# Patient Record
Sex: MALE | Race: WHITE | NOT HISPANIC OR LATINO | Employment: FULL TIME | URBAN - NONMETROPOLITAN AREA
[De-identification: names, ages, dates, MRNs, and addresses within clinical notes are randomized per-mention and may not be internally consistent; named-entity substitution may affect disease eponyms.]

---

## 2018-12-17 ENCOUNTER — HOSPITAL ENCOUNTER (EMERGENCY)
Facility: HOSPITAL | Age: 28
Discharge: HOME | End: 2018-12-17
Attending: EMERGENCY MEDICINE | Admitting: EMERGENCY MEDICINE

## 2018-12-17 VITALS
HEART RATE: 115 BPM | WEIGHT: 155 LBS | DIASTOLIC BLOOD PRESSURE: 87 MMHG | SYSTOLIC BLOOD PRESSURE: 158 MMHG | HEIGHT: 70 IN | BODY MASS INDEX: 22.19 KG/M2 | TEMPERATURE: 98.9 F | OXYGEN SATURATION: 100 % | RESPIRATION RATE: 18 BRPM

## 2018-12-17 DIAGNOSIS — F32.A DEPRESSION, UNSPECIFIED DEPRESSION TYPE: Primary | ICD-10-CM

## 2018-12-17 LAB
6-ACETYL MORPHINE: NEGATIVE
ALBUMIN SERPL-MCNC: 4.5 G/DL (ref 3.5–5)
ALBUMIN/GLOB SERPL: 1.5 G/DL (ref 1.5–2.5)
ALP SERPL-CCNC: 93 U/L (ref 40–129)
ALT SERPL W P-5'-P-CCNC: 21 U/L (ref 10–44)
AMPHET+METHAMPHET UR QL: POSITIVE
ANION GAP SERPL CALCULATED.3IONS-SCNC: 6.3 MMOL/L (ref 3.6–11.2)
AST SERPL-CCNC: 21 U/L (ref 10–34)
BACTERIA UR QL AUTO: ABNORMAL /HPF
BARBITURATES UR QL SCN: NEGATIVE
BASOPHILS # BLD AUTO: 0.02 10*3/MM3 (ref 0–0.3)
BASOPHILS NFR BLD AUTO: 0.2 % (ref 0–2)
BENZODIAZ UR QL SCN: NEGATIVE
BILIRUB SERPL-MCNC: 0.5 MG/DL (ref 0.2–1.8)
BILIRUB UR QL STRIP: NEGATIVE
BUN BLD-MCNC: 8 MG/DL (ref 7–21)
BUN/CREAT SERPL: 10.3 (ref 7–25)
BUPRENORPHINE SERPL-MCNC: POSITIVE NG/ML
CALCIUM SPEC-SCNC: 8.9 MG/DL (ref 7.7–10)
CANNABINOIDS SERPL QL: NEGATIVE
CHLORIDE SERPL-SCNC: 105 MMOL/L (ref 99–112)
CLARITY UR: CLEAR
CO2 SERPL-SCNC: 26.7 MMOL/L (ref 24.3–31.9)
COCAINE UR QL: NEGATIVE
COLOR UR: YELLOW
CREAT BLD-MCNC: 0.78 MG/DL (ref 0.43–1.29)
DEPRECATED RDW RBC AUTO: 36.2 FL (ref 37–54)
EOSINOPHIL # BLD AUTO: 0.35 10*3/MM3 (ref 0–0.7)
EOSINOPHIL NFR BLD AUTO: 4.1 % (ref 0–5)
ERYTHROCYTE [DISTWIDTH] IN BLOOD BY AUTOMATED COUNT: 11.8 % (ref 11.5–14.5)
ETHANOL BLD-MCNC: <10 MG/DL
ETHANOL UR QL: <0.01 %
GFR SERPL CREATININE-BSD FRML MDRD: 119 ML/MIN/1.73
GLOBULIN UR ELPH-MCNC: 3.1 GM/DL
GLUCOSE BLD-MCNC: 77 MG/DL (ref 70–110)
GLUCOSE UR STRIP-MCNC: NEGATIVE MG/DL
HCT VFR BLD AUTO: 40.5 % (ref 42–52)
HGB BLD-MCNC: 14 G/DL (ref 14–18)
HGB UR QL STRIP.AUTO: NEGATIVE
HYALINE CASTS UR QL AUTO: ABNORMAL /LPF
IMM GRANULOCYTES # BLD: 0.01 10*3/MM3 (ref 0–0.03)
IMM GRANULOCYTES NFR BLD: 0.1 % (ref 0–0.5)
KETONES UR QL STRIP: NEGATIVE
LEUKOCYTE ESTERASE UR QL STRIP.AUTO: ABNORMAL
LYMPHOCYTES # BLD AUTO: 2.41 10*3/MM3 (ref 1–3)
LYMPHOCYTES NFR BLD AUTO: 28.4 % (ref 21–51)
MAGNESIUM SERPL-MCNC: 2 MG/DL (ref 1.7–2.6)
MCH RBC QN AUTO: 29.3 PG (ref 27–33)
MCHC RBC AUTO-ENTMCNC: 34.6 G/DL (ref 33–37)
MCV RBC AUTO: 84.7 FL (ref 80–94)
METHADONE UR QL SCN: NEGATIVE
MONOCYTES # BLD AUTO: 0.87 10*3/MM3 (ref 0.1–0.9)
MONOCYTES NFR BLD AUTO: 10.3 % (ref 0–10)
NEUTROPHILS # BLD AUTO: 4.82 10*3/MM3 (ref 1.4–6.5)
NEUTROPHILS NFR BLD AUTO: 56.9 % (ref 30–70)
NITRITE UR QL STRIP: NEGATIVE
OPIATES UR QL: NEGATIVE
OSMOLALITY SERPL CALC.SUM OF ELEC: 272.8 MOSM/KG (ref 273–305)
OXYCODONE UR QL SCN: NEGATIVE
PCP UR QL SCN: NEGATIVE
PH UR STRIP.AUTO: 6.5 [PH] (ref 5–8)
PLATELET # BLD AUTO: 241 10*3/MM3 (ref 130–400)
PMV BLD AUTO: 10.2 FL (ref 6–10)
POTASSIUM BLD-SCNC: 3.6 MMOL/L (ref 3.5–5.3)
PROT SERPL-MCNC: 7.6 G/DL (ref 6–8)
PROT UR QL STRIP: NEGATIVE
RBC # BLD AUTO: 4.78 10*6/MM3 (ref 4.7–6.1)
RBC # UR: ABNORMAL /HPF
REF LAB TEST METHOD: ABNORMAL
SODIUM BLD-SCNC: 138 MMOL/L (ref 135–153)
SP GR UR STRIP: 1.01 (ref 1–1.03)
SQUAMOUS #/AREA URNS HPF: ABNORMAL /HPF
UROBILINOGEN UR QL STRIP: ABNORMAL
WBC NRBC COR # BLD: 8.48 10*3/MM3 (ref 4.5–12.5)
WBC UR QL AUTO: ABNORMAL /HPF

## 2018-12-17 PROCEDURE — 99284 EMERGENCY DEPT VISIT MOD MDM: CPT

## 2018-12-17 PROCEDURE — 80307 DRUG TEST PRSMV CHEM ANLYZR: CPT | Performed by: PHYSICIAN ASSISTANT

## 2018-12-17 PROCEDURE — 83735 ASSAY OF MAGNESIUM: CPT | Performed by: PHYSICIAN ASSISTANT

## 2018-12-17 PROCEDURE — 80053 COMPREHEN METABOLIC PANEL: CPT | Performed by: PHYSICIAN ASSISTANT

## 2018-12-17 PROCEDURE — 85025 COMPLETE CBC W/AUTO DIFF WBC: CPT | Performed by: PHYSICIAN ASSISTANT

## 2018-12-17 PROCEDURE — 81001 URINALYSIS AUTO W/SCOPE: CPT | Performed by: PHYSICIAN ASSISTANT

## 2018-12-17 RX ORDER — HYDROXYZINE HYDROCHLORIDE 25 MG/1
25 TABLET, FILM COATED ORAL EVERY 8 HOURS PRN
Qty: 15 TABLET | Refills: 0 | Status: SHIPPED | OUTPATIENT
Start: 2018-12-17 | End: 2019-03-07

## 2018-12-17 NOTE — NURSING NOTE
Patient reported to ED with generalized anxiety and depression. Patient denies SI and HI. Patient rated anxiety 3/10 and depression 7/10.Patient stated this last year has been a nightmare with a serious job loss and a serious break-up involving children.Patient denies all drug and ETOH abuse .

## 2018-12-18 NOTE — NURSING NOTE
Clinicals presented to Dr. Rhodes. Orders received to d/c patient home with information for outpatient follow up. Dr. Rhodes recommends patient also be given prescription for Vistaril. ED provider made aware.

## 2018-12-18 NOTE — ED PROVIDER NOTES
Subjective   28-year-old male who presents to the ED today for a mental health evaluation.  He states he has had a rough year and a half.  He states his depression has gotten worse over the last 2 months.  He states his sister recommended that he come here for an evaluation.  He states he has recently gone through a divorce.  He has lost 2 different jobs.  He recently broke up with his new girlfriend.  He denies any suicidal or homicidal ideations.  He denies any drug use.  He states he does drink one or 2 beers per week with dinner.  He states his appetite has been poor.  He states he sleeps all the time.  He denies any hallucinations.  He states he is currently on Zoloft and BuSpar and has been taking them as prescribed.        History provided by:  Patient  Mental Health Problem   Presenting symptoms: depression    Presenting symptoms: no suicidal thoughts    Degree of incapacity (severity):  Moderate  Onset quality:  Gradual  Duration:  2 months  Timing:  Constant  Progression:  Worsening  Chronicity:  New  Context: stressful life event    Context: not alcohol use and not drug abuse    Treatment compliance:  All of the time  Relieved by:  Nothing  Worsened by:  Nothing  Associated symptoms: anhedonia, appetite change and hypersomnia    Risk factors: family hx of mental illness and hx of mental illness        Review of Systems   Constitutional: Positive for appetite change.   HENT: Negative.    Eyes: Negative.    Respiratory: Negative.    Cardiovascular: Negative.    Gastrointestinal: Negative.    Genitourinary: Negative.    Musculoskeletal: Negative.    Skin: Negative.    Neurological: Negative.    Psychiatric/Behavioral: Positive for dysphoric mood. Negative for suicidal ideas.   All other systems reviewed and are negative.      Past Medical History:   Diagnosis Date   • Anxiety    • Depression        No Known Allergies    History reviewed. No pertinent surgical history.    Family History   Problem Relation Age  of Onset   • Anxiety disorder Sister    • Depression Sister        Social History     Socioeconomic History   • Marital status: Single     Spouse name: Not on file   • Number of children: Not on file   • Years of education: Not on file   • Highest education level: Not on file   Tobacco Use   • Smoking status: Current Every Day Smoker     Years: 6.00     Types: Electronic Cigarette   Substance and Sexual Activity   • Alcohol use: Yes     Frequency: Never     Comment: 1-2 weekly   • Sexual activity: Not Currently     Partners: Female           Objective   Physical Exam   Constitutional: He is oriented to person, place, and time. He appears well-developed and well-nourished. No distress.   HENT:   Head: Normocephalic and atraumatic.   Right Ear: External ear normal.   Left Ear: External ear normal.   Nose: Nose normal.   Mouth/Throat: Oropharynx is clear and moist.   Eyes: Conjunctivae and EOM are normal. Pupils are equal, round, and reactive to light.   Neck: Normal range of motion. Neck supple.   Cardiovascular: Regular rhythm, normal heart sounds and intact distal pulses. Tachycardia present.   Pulmonary/Chest: Effort normal and breath sounds normal.   Abdominal: Soft. Bowel sounds are normal.   Musculoskeletal: Normal range of motion.   Neurological: He is alert and oriented to person, place, and time.   Skin: Skin is warm and dry. Capillary refill takes less than 2 seconds.   Psychiatric: His speech is normal and behavior is normal. Judgment normal. His mood appears anxious. Cognition and memory are normal. He exhibits a depressed mood. He expresses no homicidal and no suicidal ideation.   Nursing note and vitals reviewed.      Procedures           ED Course  ED Course as of Dec 18 0935   Mon Dec 17, 2018   1908 Medically clear for psych  []   1922 Psychiatry advised patient can be discharged at this time.  []      ED Course User Index  [] Virginia Orourke, PA                  [facility]  Number of Diagnoses or  Management Options  Depression, unspecified depression type:      Amount and/or Complexity of Data Reviewed  Clinical lab tests: reviewed    Patient Progress  Patient progress: stable        Final diagnoses:   Depression, unspecified depression type            Virginia Orourke, PA  12/18/18 6534

## 2019-03-07 ENCOUNTER — OFFICE VISIT (OUTPATIENT)
Dept: PSYCHIATRY | Facility: CLINIC | Age: 29
End: 2019-03-07

## 2019-03-07 VITALS
HEIGHT: 70 IN | DIASTOLIC BLOOD PRESSURE: 82 MMHG | WEIGHT: 169.6 LBS | SYSTOLIC BLOOD PRESSURE: 137 MMHG | BODY MASS INDEX: 24.28 KG/M2 | HEART RATE: 74 BPM

## 2019-03-07 DIAGNOSIS — F33.1 MAJOR DEPRESSIVE DISORDER, RECURRENT EPISODE, MODERATE (HCC): ICD-10-CM

## 2019-03-07 DIAGNOSIS — F17.200 NICOTINE DEPENDENCE, UNCOMPLICATED, UNSPECIFIED NICOTINE PRODUCT TYPE: ICD-10-CM

## 2019-03-07 DIAGNOSIS — F11.20 UNCOMPLICATED OPIOID DEPENDENCE (HCC): ICD-10-CM

## 2019-03-07 DIAGNOSIS — Z79.899 MEDICATION MANAGEMENT: Primary | ICD-10-CM

## 2019-03-07 DIAGNOSIS — F15.20 METHAMPHETAMINE ADDICTION (HCC): ICD-10-CM

## 2019-03-07 LAB
AMPHETAMINE CUT-OFF: NORMAL
BENZODIAZIPINE CUT-OFF: NORMAL
BUPRENORPHINE CUT-OFF: NORMAL
COCAINE CUT-OFF: NORMAL
EXTERNAL AMPHETAMINE SCREEN URINE: NEGATIVE
EXTERNAL BENZODIAZEPINE SCREEN URINE: NEGATIVE
EXTERNAL BUPRENORPHINE SCREEN URINE: NEGATIVE
EXTERNAL COCAINE SCREEN URINE: NEGATIVE
EXTERNAL MDMA: NEGATIVE
EXTERNAL METHADONE SCREEN URINE: NEGATIVE
EXTERNAL METHAMPHETAMINE SCREEN URINE: NEGATIVE
EXTERNAL OPIATES SCREEN URINE: NEGATIVE
EXTERNAL OXYCODONE SCREEN URINE: NEGATIVE
EXTERNAL THC SCREEN URINE: NEGATIVE
MDMA CUT-OFF: NORMAL
METHADONE CUT-OFF: NORMAL
METHAMPHETAMINE CUT-OFF: NORMAL
OPIATES CUT-OFF: NORMAL
OXYCODONE CUT-OFF: NORMAL
THC CUT-OFF: NORMAL

## 2019-03-07 PROCEDURE — 90791 PSYCH DIAGNOSTIC EVALUATION: CPT | Performed by: SOCIAL WORKER

## 2019-03-07 RX ORDER — LORATADINE 10 MG/1
TABLET ORAL
Refills: 0 | COMMUNITY
Start: 2019-01-30 | End: 2019-03-22
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
## 2019-03-11 DIAGNOSIS — F17.200 TOBACCO USE DISORDER: ICD-10-CM

## 2019-03-11 DIAGNOSIS — F33.1 MAJOR DEPRESSIVE DISORDER, RECURRENT EPISODE, MODERATE (HCC): ICD-10-CM

## 2019-03-11 DIAGNOSIS — F11.20 OPIOID TYPE DEPENDENCE, CONTINUOUS (HCC): ICD-10-CM

## 2019-03-11 DIAGNOSIS — F15.20 AMPHETAMINE DEPENDENCE (HCC): Primary | ICD-10-CM

## 2019-03-12 ENCOUNTER — TELEPHONE (OUTPATIENT)
Dept: PSYCHIATRY | Facility: CLINIC | Age: 29
End: 2019-03-12

## 2019-03-14 ENCOUNTER — OFFICE VISIT (OUTPATIENT)
Dept: PSYCHIATRY | Facility: CLINIC | Age: 29
End: 2019-03-14

## 2019-03-14 ENCOUNTER — LAB (OUTPATIENT)
Dept: LAB | Facility: HOSPITAL | Age: 29
End: 2019-03-14

## 2019-03-14 VITALS
WEIGHT: 174.4 LBS | SYSTOLIC BLOOD PRESSURE: 136 MMHG | HEART RATE: 80 BPM | DIASTOLIC BLOOD PRESSURE: 80 MMHG | HEIGHT: 70 IN | BODY MASS INDEX: 24.97 KG/M2

## 2019-03-14 DIAGNOSIS — F15.20 METHAMPHETAMINE ADDICTION (HCC): Primary | ICD-10-CM

## 2019-03-14 DIAGNOSIS — F33.1 MODERATE EPISODE OF RECURRENT MAJOR DEPRESSIVE DISORDER (HCC): ICD-10-CM

## 2019-03-14 DIAGNOSIS — F17.290 OTHER TOBACCO PRODUCT NICOTINE DEPENDENCE, UNCOMPLICATED: ICD-10-CM

## 2019-03-14 DIAGNOSIS — F11.20 UNCOMPLICATED OPIOID DEPENDENCE (HCC): ICD-10-CM

## 2019-03-14 DIAGNOSIS — F43.22 ADJUSTMENT DISORDER WITH ANXIOUS MOOD: ICD-10-CM

## 2019-03-14 DIAGNOSIS — F15.20 METHAMPHETAMINE ADDICTION (HCC): ICD-10-CM

## 2019-03-14 LAB
HAV IGM SERPL QL IA: NORMAL
HBV CORE IGM SERPL QL IA: NORMAL
HBV SURFACE AG SERPL QL IA: NORMAL
HCV AB SER DONR QL: NORMAL
HIV1+2 AB SER QL: NORMAL

## 2019-03-14 PROCEDURE — G0432 EIA HIV-1/HIV-2 SCREEN: HCPCS

## 2019-03-14 PROCEDURE — 80074 ACUTE HEPATITIS PANEL: CPT

## 2019-03-14 PROCEDURE — 90792 PSYCH DIAG EVAL W/MED SRVCS: CPT | Performed by: NURSE PRACTITIONER

## 2019-03-14 PROCEDURE — 36415 COLL VENOUS BLD VENIPUNCTURE: CPT

## 2019-03-14 RX ORDER — NALTREXONE HYDROCHLORIDE 50 MG/1
50 TABLET, FILM COATED ORAL DAILY
Qty: 30 TABLET | Refills: 0 | Status: SHIPPED | OUTPATIENT
Start: 2019-03-14 | End: 2019-04-10 | Stop reason: SDUPTHER

## 2019-03-14 RX ORDER — SERTRALINE HYDROCHLORIDE 100 MG/1
100 TABLET, FILM COATED ORAL DAILY
Qty: 30 TABLET | Refills: 0 | Status: SHIPPED | OUTPATIENT
Start: 2019-03-14 | End: 2019-04-10 | Stop reason: SDUPTHER

## 2019-03-14 NOTE — PROGRESS NOTES
Subjective   Valentín Mcghee is a 28 y.o. male who is here today for the first time for medication management     Chief Complaint:  Anxiety related to substance abuse     History of Present Illness  Patient presents today after not being able to go into the IOP program because his work refuses to let him off. Patient reports he has contacted his  but they still will not let him off work.  Patient reports that he wanted the extra support and program to help him stay clean as currently he has home life is more stable but he is worried if it becomes unstable how he will cope.  Patient reports that he does enjoy doing his job and it has been helpful for his anxiety and depression as well as staying clean.  Patient reports that when he was here in the hospital he was placed on Zoloft 100 mg and that had been very helpful for him which had decreased his anxiety and depression significantly.  Patient currently rates his anxiety a 2-3 along with his depression on a scale of 0-10 with 10 being the worst.  Patient reports that he is sleeping good getting roughly 6-8 hours of sleep at night.  Patient reports that his appetite has been good as he has gained 2 pounds since he was last weighed.  Patient reports that he did the [facility] which was good for him and he has been going to 12-step meetings at least twice a week.  Patient reports that he is still having some cravings but they are manageable for him but he does not want them to get out of control if something were to change at home.  Patient denies any substance abuse since January 2019 before he was admitted.  Patient adamantly denies any SI or HI.  Patient denies any side effects to the current medicine.  Patient denies any auditory or visual hallucinations.        The following portions of the patient's history were reviewed and updated as appropriate: allergies, current medications, past family history, past medical history, past social  "history, past surgical history and problem list.    Review of Systems   Psychiatric/Behavioral: Positive for dysphoric mood. The patient is nervous/anxious.    All other systems reviewed and are negative.      Objective   Physical Exam   Constitutional: He appears well-developed and well-nourished.   Psychiatric: His speech is normal and behavior is normal. Thought content normal. His mood appears anxious. Cognition and memory are normal. He exhibits a depressed mood.   Nursing note and vitals reviewed.    Blood pressure 136/80, pulse 80, height 177.8 cm (70\"), weight 79.1 kg (174 lb 6.4 oz). Body mass index is 25.02 kg/m².      No Known Allergies    Recent Results (from the past 168 hour(s))   [facility] Drug Screen    Collection Time: 03/07/19  2:08 PM   Result Value Ref Range    External Amphetamine Screen Urine Negative     Amphetamine Cut-Off 1000mg/ml     External Benzodiazepine Screen Urine Negative     Benzodiazipine Cut-Off 300mg/ml     External Cocaine Screen Urine Negative     Cocaine Cut-Off 300mg/ml     External THC Screen Urine Negative     THC Cut-Off 50mg/ml     External Methadone Screen Urine Negative     Methadone Cut-Off 300mg/ml     External Methamphetamine Screen Urine Negative     Methamphetamine Cut-Off 1000mg/ml     External Oxycodone Screen Urine Negative     Oxycodone Cut-Off 100mg/ml     External Buprenorphine Screen Urine Negative     Buprenorphine Cut-Off 10mg/ml     External MDMA Negative     MDMA Cut-Off 500mg/ml     External Opiates Screen Urine Negative     Opiates Cut-Off 300mg/ml        Current Medications:   Current Outpatient Medications   Medication Sig Dispense Refill   • loratadine (CLARITIN) 10 MG tablet GIVE ONE TABLET EACH MORNING AS NEEDED  0   • sertraline (ZOLOFT) 100 MG tablet Take 1 tablet by mouth Daily. 30 tablet 0   • naltrexone (DEPADE) 50 MG tablet Take 1 tablet by mouth Daily. 30 tablet 0     No current facility-administered medications for this visit.        Mental " Status Exam:   Hygiene:   good  Cooperation:  Cooperative  Eye Contact:  Good  Psychomotor Behavior:  Restless  Affect:  Appropriate  Hopelessness: Denies  Speech:  Normal  Thought Process:  Goal directed and Linear  Thought Content:  Normal  Suicidal:  None  Homicidal:  None  Hallucinations:  None  Delusion:  None  Memory:  Intact  Orientation:  Person, Place, Time and Situation  Reliability:  fair  Insight:  Fair  Judgement:  Fair  Impulse Control:  Fair  Physical/Medical Issues:  No     Assessment/Plan   Diagnoses and all orders for this visit:    Methamphetamine addiction (CMS/HCC)  -     sertraline (ZOLOFT) 100 MG tablet; Take 1 tablet by mouth Daily.  -     naltrexone (DEPADE) 50 MG tablet; Take 1 tablet by mouth Daily.  -     Hepatitis Panel, Acute; Future  -     HIV-1 & HIV-2 Antibodies; Future    Uncomplicated opioid dependence (CMS/HCC)  -     sertraline (ZOLOFT) 100 MG tablet; Take 1 tablet by mouth Daily.  -     naltrexone (DEPADE) 50 MG tablet; Take 1 tablet by mouth Daily.    Other tobacco product nicotine dependence, uncomplicated    Moderate episode of recurrent major depressive disorder (CMS/HCC)  -     sertraline (ZOLOFT) 100 MG tablet; Take 1 tablet by mouth Daily.    Adjustment disorder with anxious mood  -     sertraline (ZOLOFT) 100 MG tablet; Take 1 tablet by mouth Daily.        Discused medications options.  Continue Zoloft 100 mg for depression and anxiety.  Begin naltrexone 50 mg daily for opioid and Suboxone cravings.  Discussed the risks, beneefits, and side effects of the medications; patient ackowledged and verbally consented.  Patient is aware to call the [facility] with any worsening of symptoms.  Patient is agreeable to call 911 or go to the nearest ER should he/she begin having SI/HI.  Lab work was obtained for the patient as he had had a CMP in December 2018 BUN 8 creatinine 0.78 his ALT was 21 AST was 21 as well his current CMP was all within normal limits at this time  patient's last UDS on March 7 was within normal limits as well so we will begin the naltrexone oral tablet to see if he can tolerate before beginning the injections.  Discussed the side effects in detail regarding the naltrexone patient verbally agreed and consented.  Informed the patient that according to mental health rights as well as substance abuse rights he needs to contact his HR in regards to getting off work to attend her IEP program as the patient wanted to voluntarily do this for himself.  Instructed patient to not speak with managers but to HR.  Patient stated that he would call them regarding his time off needed for his treatment.  Patient states that they gave him a difficult time even taking off for his medication refills, informed patient that that is a requirement and if any notes are needed for excuses for work to inform me.  Patient was concerned regarding [facility] and his possible exposure to hepatitis we will order a hepatitis panel along with HIV testing.  Patient will follow-up in 4 weeks to evaluate his symptoms.  Informed patient that if he needs to have a sooner appointment to notify the [facility] or if any side effects were to occur, patient verbally agreed and consented.      Errors in dictation may reflect use of voice recognition software and not all errors in transcription may have been detected prior to signing.

## 2019-03-22 ENCOUNTER — OFFICE VISIT (OUTPATIENT)
Dept: RETAIL CLINIC | Facility: CLINIC | Age: 29
End: 2019-03-22

## 2019-03-22 VITALS
DIASTOLIC BLOOD PRESSURE: 64 MMHG | OXYGEN SATURATION: 98 % | TEMPERATURE: 100.7 F | WEIGHT: 180.8 LBS | HEIGHT: 69 IN | RESPIRATION RATE: 18 BRPM | SYSTOLIC BLOOD PRESSURE: 122 MMHG | BODY MASS INDEX: 26.78 KG/M2 | HEART RATE: 89 BPM

## 2019-03-22 DIAGNOSIS — J02.9 SORE THROAT: Primary | ICD-10-CM

## 2019-03-22 LAB
EXPIRATION DATE: NORMAL
EXPIRATION DATE: NORMAL
FLUAV AG NPH QL: NEGATIVE
FLUBV AG NPH QL: NEGATIVE
INTERNAL CONTROL: NORMAL
INTERNAL CONTROL: NORMAL
Lab: NORMAL
Lab: NORMAL
S PYO AG THROAT QL: NEGATIVE

## 2019-03-22 PROCEDURE — 99213 OFFICE O/P EST LOW 20 MIN: CPT | Performed by: NURSE PRACTITIONER

## 2019-03-22 PROCEDURE — 87880 STREP A ASSAY W/OPTIC: CPT | Performed by: NURSE PRACTITIONER

## 2019-03-22 PROCEDURE — 87804 INFLUENZA ASSAY W/OPTIC: CPT | Performed by: NURSE PRACTITIONER

## 2019-03-22 NOTE — PROGRESS NOTES
"RENEERAFAELAGUILA@  Valentín Mcghee is a 28 y.o. male.   Chief Complaint   Patient presents with   • Sore Throat      Sore Throat    This is a new problem. The current episode started yesterday. The problem has been unchanged. Maximum temperature: has not checked temperature, but has felt warm. The fever has been present for less than 1 day. Associated symptoms include congestion (nasal), coughing (non-productive) and headaches. Pertinent negatives include no diarrhea, shortness of breath or vomiting. He has tried nothing for the symptoms.        Valentín Mcghee  presents to [facility] with cc of sore throat. Reviewed the PMFSH. See ROS.  The following portions of the patient's history were reviewed and updated as appropriate: allergies, current medications, past family history, past medical history, past social history, past surgical history and problem list.    Current Outpatient Medications:   •  naltrexone (DEPADE) 50 MG tablet, Take 1 tablet by mouth Daily., Disp: 30 tablet, Rfl: 0  •  sertraline (ZOLOFT) 100 MG tablet, Take 1 tablet by mouth Daily., Disp: 30 tablet, Rfl: 0    No Known Allergies    Review of Systems   Constitutional: Positive for fever. Negative for fatigue.   HENT: Positive for congestion (nasal) and sore throat.    Respiratory: Positive for cough (non-productive). Negative for shortness of breath.    Cardiovascular: Negative for chest pain.   Gastrointestinal: Negative for diarrhea and vomiting.   Endocrine: Negative for cold intolerance.   Skin: Negative for rash.   Neurological: Positive for headaches.   Hematological: Negative for adenopathy.       Objective     Visit Vitals  /64   Pulse 89   Temp (!) 100.7 °F (38.2 °C) (Temporal)   Resp 18   Ht 175.3 cm (69\")   Wt 82 kg (180 lb 12.8 oz)   SpO2 98%   BMI 26.70 kg/m²         Physical Exam   Constitutional: He is oriented to person, place, and time. He appears well-developed and well-nourished. No distress.   HENT:   Head: Normocephalic and " atraumatic.   Right Ear: Tympanic membrane, external ear and ear canal normal.   Left Ear: Tympanic membrane, external ear and ear canal normal.   Nose: Mucosal edema present. Right sinus exhibits no maxillary sinus tenderness and no frontal sinus tenderness. Left sinus exhibits no maxillary sinus tenderness and no frontal sinus tenderness.   Mouth/Throat: Uvula is midline and mucous membranes are normal. Posterior oropharyngeal erythema present. No oropharyngeal exudate. Tonsils are 2+ on the right. Tonsils are 2+ on the left. No tonsillar exudate.   Eyes: Conjunctivae and EOM are normal. Pupils are equal, round, and reactive to light.   Neck: Normal range of motion. Neck supple.   Cardiovascular: Normal rate, regular rhythm and normal heart sounds.   Pulmonary/Chest: Effort normal and breath sounds normal. No respiratory distress.   Abdominal: Soft. Bowel sounds are normal.   Musculoskeletal: Normal range of motion.   Lymphadenopathy:     He has cervical adenopathy.   Neurological: He is alert and oriented to person, place, and time.   Skin: Skin is warm and dry. No rash noted.   Psychiatric: He has a normal mood and affect. His behavior is normal. Judgment and thought content normal.   Nursing note and vitals reviewed.      Lab Results (last 24 hours)     Procedure Component Value Units Date/Time    POCT rapid strep A [[id number]]  (Normal) Collected:  03/22/19 1500    Specimen:  Swab Updated:  03/22/19 1501     Rapid Strep A Screen Negative     Internal Control Passed     Lot Number [id number]     Expiration Date 8/30    POC Influenza A / B [[id number]]  (Normal) Collected:  03/22/19 1500    Specimen:  Swab Updated:  03/22/19 1500     Rapid Influenza A Ag Negative     Rapid Influenza B Ag Negative     Internal Control Passed     Lot Number [id number]     Expiration Date 20/33/21          Assessment/Plan   Valentín was seen today for sore throat.    Diagnoses and all orders for this visit:    Sore throat  -     POCT  rapid strep A  -     POC Influenza A / B

## 2019-03-22 NOTE — PATIENT INSTRUCTIONS
Sore Throat  A sore throat is pain, burning, irritation, or scratchiness in the throat. When you have a sore throat, you may feel pain or tenderness in your throat when you swallow or talk.  Many things can cause a sore throat, including:  · An infection.  · Seasonal allergies.  · Dryness in the air.  · Irritants, such as smoke or pollution.  · Gastroesophageal reflux disease (GERD).  · A tumor.    A sore throat is often the first sign of another sickness. It may happen with other symptoms, such as coughing, sneezing, fever, and swollen neck glands. Most sore throats go away without medical treatment.  Follow these instructions at home:  · Take over-the-counter medicines only as told by your health care provider.  · Drink enough fluids to keep your urine clear or pale yellow.  · Rest as needed.  · To help with pain, try:  ? Sipping warm liquids, such as broth, herbal tea, or warm water.  ? Eating or drinking cold or frozen liquids, such as frozen ice pops.  ? Gargling with a salt-water mixture 3-4 times a day or as needed. To make a salt-water mixture, completely dissolve ½-1 tsp of salt in 1 cup of warm water.  ? Sucking on hard candy or throat lozenges.  ? Putting a cool-mist humidifier in your bedroom at night to moisten the air.  ? Sitting in the bathroom with the door closed for 5-10 minutes while you run hot water in the shower.  · Do not use any tobacco products, such as cigarettes, chewing tobacco, and e-cigarettes. If you need help quitting, ask your health care provider.  Contact a health care provider if:  · You have a fever for more than 2-3 days.  · You have symptoms that last (are persistent) for more than 2-3 days.  · Your throat does not get better within 7 days.  · You have a fever and your symptoms suddenly get worse.  Get help right away if:  · You have difficulty breathing.  · You cannot swallow fluids, soft foods, or your saliva.  · You have increased swelling in your throat or neck.  · You have  persistent nausea and vomiting.  This information is not intended to replace advice given to you by your health care provider. Make sure you discuss any questions you have with your health care provider.  Document Released: 01/25/2006 Document Revised: 08/13/2017 Document Reviewed: 10/07/2016  [employer] Interactive Patient Education © 2019 [employer]

## 2019-04-10 ENCOUNTER — OFFICE VISIT (OUTPATIENT)
Dept: PSYCHIATRY | Facility: CLINIC | Age: 29
End: 2019-04-10

## 2019-04-10 VITALS
HEART RATE: 111 BPM | DIASTOLIC BLOOD PRESSURE: 91 MMHG | SYSTOLIC BLOOD PRESSURE: 140 MMHG | HEIGHT: 69 IN | BODY MASS INDEX: 26.31 KG/M2 | WEIGHT: 177.6 LBS

## 2019-04-10 DIAGNOSIS — F43.22 ADJUSTMENT DISORDER WITH ANXIOUS MOOD: ICD-10-CM

## 2019-04-10 DIAGNOSIS — F33.1 MODERATE EPISODE OF RECURRENT MAJOR DEPRESSIVE DISORDER (HCC): ICD-10-CM

## 2019-04-10 DIAGNOSIS — F11.20 UNCOMPLICATED OPIOID DEPENDENCE (HCC): ICD-10-CM

## 2019-04-10 DIAGNOSIS — F15.20 METHAMPHETAMINE ADDICTION (HCC): ICD-10-CM

## 2019-04-10 PROCEDURE — 99214 OFFICE O/P EST MOD 30 MIN: CPT | Performed by: NURSE PRACTITIONER

## 2019-04-10 RX ORDER — BUSPIRONE HYDROCHLORIDE 5 MG/1
5 TABLET ORAL 3 TIMES DAILY
Qty: 90 TABLET | Refills: 0 | Status: SHIPPED | OUTPATIENT
Start: 2019-04-10 | End: 2019-04-24 | Stop reason: SDUPTHER

## 2019-04-10 RX ORDER — SERTRALINE HYDROCHLORIDE 100 MG/1
100 TABLET, FILM COATED ORAL DAILY
Qty: 30 TABLET | Refills: 0 | Status: SHIPPED | OUTPATIENT
Start: 2019-04-10 | End: 2019-04-24 | Stop reason: SDUPTHER

## 2019-04-10 RX ORDER — NALTREXONE HYDROCHLORIDE 50 MG/1
50 TABLET, FILM COATED ORAL DAILY
Qty: 30 TABLET | Refills: 0 | Status: SHIPPED | OUTPATIENT
Start: 2019-04-10

## 2019-04-10 NOTE — PROGRESS NOTES
Subjective   Valentín Mcghee is a 28 y.o. male who is here today for the first time for medication management     Chief Complaint:  Anxiety related to substance abuse     History of Present Illness   Patient comes in today almost 15 minutes late for his appointment.  Patient states that he has not pursued getting into the IOP as he does not want to lose his job currently as it is a good income for him and he does enjoy working in the people he is around.  Patient states that he has been taking his medication as prescribed and has been doing well.  Patient denies any depressive symptoms.  Patient states that his anxiety is still roughly a 3-4 on a scale of 0-10 with 10 being the worst.  Patient states that he was at the movies one evening and there were some people smoking pot outside and he states it was a slight trigger for him and did increase in anxiety but he was able to overcome it.  Patient states that his brother in law did have a beer and stated that he was going to have to drink that somewhere else as he still feels as if triggers are present for him and when they are visualized he reports some cravings but states he is able to overcome them.  Patient's was proud and showed his 60-day clean of sobriety and states he has been going to his AA meetings.  Patient reports that his appetite has been good and he is sleeping well getting roughly 6-8 hours of sleep at night.  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  Patient reports that he would like to consider the Vivitrol injection as his concern was the long-standing use of being in his body if he got into a car wreck and if it would affect any anesthesia if he had to have surgery.  Patient  "stated that he would think about it but he was currently taking his medication and pursuits prescribed and denied any side effects.  Patient adamantly denies any SI or HI.  Patient denies any auditory or visual hallucinations.      The following portions of the patient's history were reviewed and updated as appropriate: allergies, current medications, past family history, past medical history, past social history, past surgical history and problem list.    Review of Systems   Psychiatric/Behavioral: Negative for dysphoric mood. The patient is nervous/anxious.    All other systems reviewed and are negative.      Objective   Physical Exam   Constitutional: He appears well-developed and well-nourished.   Psychiatric: His speech is normal and behavior is normal. Thought content normal. His mood appears anxious. Cognition and memory are normal. He does not exhibit a depressed mood.   Nursing note and vitals reviewed.    Blood pressure 140/91, pulse 111, height 175.3 cm (69\"), weight 80.6 kg (177 lb 9.6 oz). Body mass index is 26.23 kg/m².      No Known Allergies    No results found for this or any previous visit (from the past 168 hour(s)).    Current Medications:   Current Outpatient Medications   Medication Sig Dispense Refill   • naltrexone (DEPADE) 50 MG tablet Take 1 tablet by mouth Daily. 30 tablet 0   • sertraline (ZOLOFT) 100 MG tablet Take 1 tablet by mouth Daily. 30 tablet 0   • busPIRone (BUSPAR) 5 MG tablet Take 1 tablet by mouth 3 (Three) Times a Day. 90 tablet 0     No current facility-administered medications for this visit.        Mental Status Exam:   Hygiene:   good  Cooperation:  Cooperative  Eye Contact:  Good  Psychomotor Behavior:  Restless  Affect:  Appropriate  Hopelessness: Denies  Speech:  Normal  Thought Process:  Goal directed and Linear  Thought Content:  Normal  Suicidal:  None  Homicidal:  None  Hallucinations:  None  Delusion:  None  Memory:  Intact  Orientation:  Person, Place, Time and " Situation  Reliability:  fair  Insight:  Fair  Judgement:  Fair  Impulse Control:  Fair  Physical/Medical Issues:  No     Assessment/Plan   Diagnoses and all orders for this visit:    Methamphetamine addiction (CMS/HCC)  -     naltrexone (DEPADE) 50 MG tablet; Take 1 tablet by mouth Daily.  -     sertraline (ZOLOFT) 100 MG tablet; Take 1 tablet by mouth Daily.  -     CBC & Differential; Future  -     Comprehensive Metabolic Panel; Future  -     TSH; Future  -     T4, Free; Future    Uncomplicated opioid dependence (CMS/HCC)  -     naltrexone (DEPADE) 50 MG tablet; Take 1 tablet by mouth Daily.  -     sertraline (ZOLOFT) 100 MG tablet; Take 1 tablet by mouth Daily.    Moderate episode of recurrent major depressive disorder (CMS/HCC)  -     sertraline (ZOLOFT) 100 MG tablet; Take 1 tablet by mouth Daily.    Adjustment disorder with anxious mood  -     busPIRone (BUSPAR) 5 MG tablet; Take 1 tablet by mouth 3 (Three) Times a Day.  -     sertraline (ZOLOFT) 100 MG tablet; Take 1 tablet by mouth Daily.        I spent a total of 25  minutes in direct patient care, greater than 15 minutes (greater than 50%) were spent in coordination of care, and counseling the patient regarding addiction and anxiety . Answered any questions patient had with medication and plan.    Continue Zoloft 100 mg for depression and anxiety.  Continue naltrexone 50 mg daily for opioid and Suboxone cravings.  Begin BuSpar 5 mg up to 3 times a day for his anxiety.  Discussed the risks, beneefits, and side effects of the medications; patient ackowledged and verbally consented.  Patient is aware to call the [facility] with any worsening of symptoms.  Patient is agreeable to call 911 or go to the nearest ER should he/she begin having SI/HI.    Upon next visit will obtain lab work including CBC, CMP, TSH and free T4.  Discussed in detail Vivitrol injections as patient stated he would think about it and then decide at next visit if he would like to try  the injection.  Patient has tolerated the naltrexone without any side effects.  Discussed patient's hepatitis as well as HIV screening and his negative results.    Patient will follow-up in 4 weeks to evaluate his symptoms.  Informed patient that if he needs to have a sooner appointment to notify the [facility] or if any side effects were to occur, patient verbally agreed and consented.  Discussed his cravings and withdrawals in detail with the patient and the importance of going to his AA meetings to help cope with those situations that increase his anxiety or cravings.    Errors in dictation may reflect use of voice recognition software and not all errors in transcription may have been detected prior to signing.

## 2019-04-15 ENCOUNTER — LAB (OUTPATIENT)
Dept: LAB | Facility: HOSPITAL | Age: 29
End: 2019-04-15

## 2019-04-15 DIAGNOSIS — F17.200 TOBACCO USE DISORDER: ICD-10-CM

## 2019-04-15 DIAGNOSIS — F11.20 OPIOID TYPE DEPENDENCE, CONTINUOUS (HCC): ICD-10-CM

## 2019-04-15 DIAGNOSIS — F15.20 METHAMPHETAMINE ADDICTION (HCC): ICD-10-CM

## 2019-04-15 DIAGNOSIS — F15.20 AMPHETAMINE DEPENDENCE (HCC): ICD-10-CM

## 2019-04-15 DIAGNOSIS — F33.1 MAJOR DEPRESSIVE DISORDER, RECURRENT EPISODE, MODERATE (HCC): ICD-10-CM

## 2019-04-15 LAB
6-ACETYL MORPHINE: NEGATIVE
ALBUMIN SERPL-MCNC: 4.8 G/DL (ref 3.5–5.2)
ALBUMIN/GLOB SERPL: 1.4 G/DL
ALP SERPL-CCNC: 111 U/L (ref 39–117)
ALT SERPL W P-5'-P-CCNC: 24 U/L (ref 1–41)
AMPHET+METHAMPHET UR QL: NEGATIVE
ANION GAP SERPL CALCULATED.3IONS-SCNC: 15.9 MMOL/L
AST SERPL-CCNC: 27 U/L (ref 1–40)
BARBITURATES UR QL SCN: NEGATIVE
BASOPHILS # BLD AUTO: 0.04 10*3/MM3 (ref 0–0.2)
BASOPHILS NFR BLD AUTO: 0.5 % (ref 0–1.5)
BENZODIAZ UR QL SCN: NEGATIVE
BILIRUB SERPL-MCNC: 0.3 MG/DL (ref 0.2–1.2)
BUN BLD-MCNC: 12 MG/DL (ref 6–20)
BUN/CREAT SERPL: 12.2 (ref 7–25)
BUPRENORPHINE SERPL-MCNC: NEGATIVE NG/ML
CALCIUM SPEC-SCNC: 9.5 MG/DL (ref 8.6–10.5)
CANNABINOIDS SERPL QL: NEGATIVE
CHLORIDE SERPL-SCNC: 98 MMOL/L (ref 98–107)
CO2 SERPL-SCNC: 25.1 MMOL/L (ref 22–29)
COCAINE UR QL: NEGATIVE
CREAT BLD-MCNC: 0.98 MG/DL (ref 0.76–1.27)
DEPRECATED RDW RBC AUTO: 40.3 FL (ref 37–54)
EOSINOPHIL # BLD AUTO: 0.26 10*3/MM3 (ref 0–0.4)
EOSINOPHIL NFR BLD AUTO: 3.4 % (ref 0.3–6.2)
ERYTHROCYTE [DISTWIDTH] IN BLOOD BY AUTOMATED COUNT: 12.2 % (ref 12.3–15.4)
GFR SERPL CREATININE-BSD FRML MDRD: 91 ML/MIN/1.73
GLOBULIN UR ELPH-MCNC: 3.5 GM/DL
GLUCOSE BLD-MCNC: 96 MG/DL (ref 65–99)
HCT VFR BLD AUTO: 46.5 % (ref 37.5–51)
HGB BLD-MCNC: 15 G/DL (ref 13–17.7)
IMM GRANULOCYTES # BLD AUTO: 0.02 10*3/MM3 (ref 0–0.05)
IMM GRANULOCYTES NFR BLD AUTO: 0.3 % (ref 0–0.5)
LYMPHOCYTES # BLD AUTO: 1.99 10*3/MM3 (ref 0.7–3.1)
LYMPHOCYTES NFR BLD AUTO: 26 % (ref 19.6–45.3)
MCH RBC QN AUTO: 29 PG (ref 26.6–33)
MCHC RBC AUTO-ENTMCNC: 32.3 G/DL (ref 31.5–35.7)
MCV RBC AUTO: 89.8 FL (ref 79–97)
METHADONE UR QL SCN: NEGATIVE
MONOCYTES # BLD AUTO: 0.97 10*3/MM3 (ref 0.1–0.9)
MONOCYTES NFR BLD AUTO: 12.7 % (ref 5–12)
NEUTROPHILS # BLD AUTO: 4.38 10*3/MM3 (ref 1.4–7)
NEUTROPHILS NFR BLD AUTO: 57.1 % (ref 42.7–76)
NRBC BLD AUTO-RTO: 0 /100 WBC (ref 0–0)
OPIATES UR QL: NEGATIVE
OXYCODONE UR QL SCN: NEGATIVE
PCP UR QL SCN: NEGATIVE
PLATELET # BLD AUTO: 223 10*3/MM3 (ref 140–450)
PMV BLD AUTO: 10.5 FL (ref 6–12)
POTASSIUM BLD-SCNC: 4.1 MMOL/L (ref 3.5–5.2)
PROT SERPL-MCNC: 8.3 G/DL (ref 6–8.5)
RBC # BLD AUTO: 5.18 10*6/MM3 (ref 4.14–5.8)
SODIUM BLD-SCNC: 139 MMOL/L (ref 136–145)
T4 FREE SERPL-MCNC: 1.04 NG/DL (ref 0.93–1.7)
TSH SERPL DL<=0.05 MIU/L-ACNC: 1.77 MIU/ML (ref 0.27–4.2)
WBC NRBC COR # BLD: 7.66 10*3/MM3 (ref 3.4–10.8)

## 2019-04-15 PROCEDURE — 36415 COLL VENOUS BLD VENIPUNCTURE: CPT

## 2019-04-15 PROCEDURE — 80307 DRUG TEST PRSMV CHEM ANLYZR: CPT

## 2019-04-15 PROCEDURE — 84443 ASSAY THYROID STIM HORMONE: CPT

## 2019-04-15 PROCEDURE — 85025 COMPLETE CBC W/AUTO DIFF WBC: CPT

## 2019-04-15 PROCEDURE — 84439 ASSAY OF FREE THYROXINE: CPT

## 2019-04-15 PROCEDURE — 80053 COMPREHEN METABOLIC PANEL: CPT

## 2019-04-16 LAB — ETHANOL UR-MCNC: NEGATIVE %

## 2019-04-24 ENCOUNTER — OFFICE VISIT (OUTPATIENT)
Dept: PSYCHIATRY | Facility: CLINIC | Age: 29
End: 2019-04-24

## 2019-04-24 VITALS
WEIGHT: 181 LBS | HEART RATE: 120 BPM | BODY MASS INDEX: 26.81 KG/M2 | SYSTOLIC BLOOD PRESSURE: 145 MMHG | DIASTOLIC BLOOD PRESSURE: 90 MMHG | HEIGHT: 69 IN

## 2019-04-24 DIAGNOSIS — F15.20 METHAMPHETAMINE ADDICTION (HCC): ICD-10-CM

## 2019-04-24 DIAGNOSIS — F33.1 MODERATE EPISODE OF RECURRENT MAJOR DEPRESSIVE DISORDER (HCC): ICD-10-CM

## 2019-04-24 DIAGNOSIS — F43.22 ADJUSTMENT DISORDER WITH ANXIOUS MOOD: ICD-10-CM

## 2019-04-24 DIAGNOSIS — Z79.899 MEDICATION MANAGEMENT: Primary | ICD-10-CM

## 2019-04-24 DIAGNOSIS — F11.20 UNCOMPLICATED OPIOID DEPENDENCE (HCC): ICD-10-CM

## 2019-04-24 PROCEDURE — 90833 PSYTX W PT W E/M 30 MIN: CPT | Performed by: NURSE PRACTITIONER

## 2019-04-24 PROCEDURE — 99214 OFFICE O/P EST MOD 30 MIN: CPT | Performed by: NURSE PRACTITIONER

## 2019-04-24 RX ORDER — BUSPIRONE HYDROCHLORIDE 10 MG/1
10 TABLET ORAL 3 TIMES DAILY
Qty: 90 TABLET | Refills: 0 | Status: SHIPPED | OUTPATIENT
Start: 2019-04-24 | End: 2019-04-24

## 2019-04-24 RX ORDER — SERTRALINE HYDROCHLORIDE 100 MG/1
150 TABLET, FILM COATED ORAL DAILY
Qty: 45 TABLET | Refills: 0 | Status: SHIPPED | OUTPATIENT
Start: 2019-04-24 | End: 2019-04-24

## 2019-04-24 RX ORDER — SERTRALINE HYDROCHLORIDE 100 MG/1
150 TABLET, FILM COATED ORAL DAILY
Qty: 45 TABLET | Refills: 0 | Status: SHIPPED | OUTPATIENT
Start: 2019-04-24

## 2019-04-24 RX ORDER — BUSPIRONE HYDROCHLORIDE 10 MG/1
10 TABLET ORAL 3 TIMES DAILY
Qty: 90 TABLET | Refills: 0 | Status: SHIPPED | OUTPATIENT
Start: 2019-04-24

## 2019-04-24 NOTE — PROGRESS NOTES
Subjective   Valentín Mcghee is a 28 y.o. male who is here today needing to be seen emergently today.     Chief Complaint: depression    History of Present Illness   Patient comes in today without an appointment needing to be seen emergently patient was worked in.  Patient is reporting depression and anxiety have been worsening and he started having suicidal thoughts yesterday.  He reports that he tried to sign up for the  but due to the fact that he was recently started on Zoloft and his depression and anxiety he had to go on a deferment for 3 years.  Patient then reports that his sister told him that he would have to move out of the house by August.  He states that everything was just coming down on him at once and he was having suicidal thoughts and severe depression.  Patient reports that today has been better and he has not had any suicidal thoughts.  Patient states that when he did have some suicidal thoughts he did not have any plan or intent.  Patient states that today he has been talking with people at work regarding places he could stay until he found an apartment.  He states that he did apply to [employer] school yesterday.  He states he received a call from [employer] today and will be calling them back as he applied for a job a week ago.  He states that he does feel better today but he needed to talk with someone to have reassurance.  Patient currently rates his depression a 6 out of 10 on a scale of 0-10 with 10 being the worst and his anxiety is 7 out of 10 on a scale of 0-10 with 10 being the worst.  Patient denies any side effects to the current medications.  He states that he is doing good with the naltrexone is not having any withdrawals or cravings.  Stated that he has been going to AA meetings and talking with others but was still having a difficult time yesterday and felt he needed to come in today.  Patient states that his appetite has been okay.  Patient feels that he is going good  "with his sobriety is just other life stressors that are causing his depression and anxiety to worsen at times.  Patient states that he normally sleeps well as he sleeps on a caught in the living room so it can be noisy so he may only get 6-7 hours of sleep at night and then take a nap during the day.  Patient reports that he tolerated the BuSpar well and did not exacerbate any of his symptoms but he has not noted much difference.  Patient denies any auditory visual hallucinations.  Patient denies any HI.  Patient adamantly denies any SI today but states it did occur yesterday but adamantly denies any plan or intent.      The following portions of the patient's history were reviewed and updated as appropriate: allergies, current medications, past family history, past medical history, past social history, past surgical history and problem list.    Review of Systems   Psychiatric/Behavioral: Positive for dysphoric mood. The patient is nervous/anxious.    All other systems reviewed and are negative.      Objective   Physical Exam   Constitutional: He appears well-developed and well-nourished.   Psychiatric: His speech is normal and behavior is normal. Judgment normal. His mood appears anxious. Cognition and memory are normal. He exhibits a depressed mood.   Nursing note and vitals reviewed.    Blood pressure 145/90, pulse 120, height 175.3 cm (69\"), weight 82.1 kg (181 lb). Body mass index is 26.73 kg/m².      No Known Allergies    Recent Results (from the past 168 hour(s))   [facility] Drug Screen    Collection Time: 04/24/19 10:46 AM   Result Value Ref Range    External Amphetamine Screen Urine Negative     Amphetamine Cut-Off 1000mg/ml     External Benzodiazepine Screen Urine Negative     Benzodiazipine Cut-Off 300mg/ml     External Cocaine Screen Urine Negative     Cocaine Cut-Off 300mg/ml     External THC Screen Urine Negative     THC Cut-Off 50mg/ml     External Methadone Screen Urine Negative     Methadone Cut-Off " 300mg/ml     External Methamphetamine Screen Urine Negative     Methamphetamine Cut-Off 1000mg/ml     External Oxycodone Screen Urine Negative     Oxycodone Cut-Off 100mg/ml     External Buprenorphine Screen Urine Negative     Buprenorphine Cut-Off 10mg/ml     External MDMA Negative     MDMA Cut-Off 500mg/ml     External Opiates Screen Urine Negative     Opiates Cut-Off 300mg/ml        Current Medications:   Current Outpatient Medications   Medication Sig Dispense Refill   • busPIRone (BUSPAR) 10 MG tablet Take 1 tablet by mouth 3 (Three) Times a Day. 90 tablet 0   • naltrexone (DEPADE) 50 MG tablet Take 1 tablet by mouth Daily. 30 tablet 0   • sertraline (ZOLOFT) 100 MG tablet Take 1 and 1/2 tablets by mouth Daily. 45 tablet 0     No current facility-administered medications for this visit.        Mental Status Exam:   Hygiene:   good  Cooperation:  Cooperative  Eye Contact:  Good  Psychomotor Behavior:  Restless  Affect:  Appropriate  Hopelessness: 1-2  Speech:  Normal  Thought Process:  Goal directed and Linear  Thought Content:  Normal  Suicidal:  NoneYesterday but not any today but without any plan or intent  Homicidal:  None  Hallucinations:  None  Delusion:  None  Memory:  Intact  Orientation:  Person, Place, Time and Situation  Reliability:  fair  Insight:  Fair  Judgement:  Fair  Impulse Control:  Fair  Physical/Medical Issues:  No     Assessment/Plan   Diagnoses and all orders for this visit:    Medication management  -     [facility] Drug Screen    Adjustment disorder with anxious mood  -     Discontinue: busPIRone (BUSPAR) 10 MG tablet; Take 1 tablet by mouth 3 (Three) Times a Day.  -     Discontinue: sertraline (ZOLOFT) 100 MG tablet; Take 1.5 tablets by mouth Daily.  -     busPIRone (BUSPAR) 10 MG tablet; Take 1 tablet by mouth 3 (Three) Times a Day.  -     sertraline (ZOLOFT) 100 MG tablet; Take 1 and 1/2 tablets by mouth Daily.    Methamphetamine addiction (CMS/[facility])  -     Discontinue: sertraline  (ZOLOFT) 100 MG tablet; Take 1.5 tablets by mouth Daily.  -     sertraline (ZOLOFT) 100 MG tablet; Take 1 and 1/2 tablets by mouth Daily.    Uncomplicated opioid dependence (CMS/HCC)  -     Discontinue: sertraline (ZOLOFT) 100 MG tablet; Take 1.5 tablets by mouth Daily.  -     sertraline (ZOLOFT) 100 MG tablet; Take 1 and 1/2 tablets by mouth Daily.    Moderate episode of recurrent major depressive disorder (CMS/HCC)  -     Discontinue: sertraline (ZOLOFT) 100 MG tablet; Take 1.5 tablets by mouth Daily.  -     sertraline (ZOLOFT) 100 MG tablet; Take 1 and 1/2 tablets by mouth Daily.      UDS reviewed.   I spent a total of 25  minutes in direct patient care, greater than 15 minutes (greater than 50%) were spent in coordination of care, and counseling the patient regarding depression and anxiety . Answered any questions patient had with medication and plan.   Increase Zoloft to 150 mg for any depression and anxiety.  Continue naltrexone 50 mg daily for opioid and Suboxone cravings.  Crease BuSpar to 10 mg up to 3 times a day for his anxiety.  Discussed the risks, beneefits, and side effects of the medications; patient ackowledged and verbally consented.  Patient is aware to call the [facility] with any worsening of symptoms.  Patient is agreeable to call 911 or go to the nearest ER should he/she begin having SI/HI.  Discussed his cravings and withdrawals in detail with the patient and the importance of going to his AA meetings to help cope with those situations that increase his anxiety or cravings.  Patient has an appointment next week and will keep that appointment for follow-up to ensure that his depression and anxiety are not worsening.  Schedule patient with therapist as he is seeing Fernanda here at the [facility], informed patient the importance of continuing therapy to help with his coping skills and depression during a difficult transition for him as there are many life changes going on.  Once again  reminded patient that if he has any suicidal thoughts with plan or intent to go to the nearest emergency room.  Informed patient that if the increase in medication worsened any symptoms to contact the [facility] patient verbally agreed and consented. Patient was encouraged to not have fire arms in the home. If firearms are present they need to be locked and secured and bullets in separate areas. The patient was also encouraged if suicidal thoughts are present then he should discontinue the medications, call the [facility] and report to the ER for psychiatric evaluation.         Errors in dictation may reflect use of voice recognition software and not all errors in transcription may have been detected prior to signing.      [id number] 10:40-10:57am 17 minutes spent on SUPPORTIVE PSYCHOTHERAPY: continuing efforts to promote the therapeutic alliance, address the patient’s issues, and strengthen self awareness, insights, and coping skills.  Patient came in emergently as he reports he had suicidal ideation yesterday but without any plan or intent.  Focused on patient's current goals and the positive aspects rather than the negative good with aspects going on or around his life.  Patient stated that his family asked him to leave in August informed patient to turn that into a positive as he states that it it was a stressful situation for him and he wanted to get a place already and that he has roughly 4 months to do so.  Patient did report that he is already been talking to friends about places he could stay in the meantime.  Patient did state that he applied for school as he wants to become a nurse.  Praised patient for taking initiative and moving forward in his life.  Patient was highly discouraged that the  would not accept him for another 3 years since he is on Zoloft, encourage patient to continue focusing on the positive in his life as far as his safety getting his mood as well as anxiety stabilized and  then reevaluating  in 3 years.  Patient stated that he did apply to other jobs and [employer] called him and he will be calling him back.  Praised patient again for applying for other jobs and encouraged him to continue doing so and moving forward and take on one task at a time and focus on living one day at a time to avoid thinking too far into the future to increase anxiety and depression.

## 2019-04-25 ENCOUNTER — OFFICE VISIT (OUTPATIENT)
Dept: PSYCHIATRY | Facility: CLINIC | Age: 29
End: 2019-04-25

## 2019-04-25 DIAGNOSIS — F33.1 MODERATE EPISODE OF RECURRENT MAJOR DEPRESSIVE DISORDER (HCC): ICD-10-CM

## 2019-04-25 DIAGNOSIS — F15.20 METHAMPHETAMINE ADDICTION (HCC): ICD-10-CM

## 2019-04-25 DIAGNOSIS — F43.22 ADJUSTMENT DISORDER WITH ANXIOUS MOOD: Primary | ICD-10-CM

## 2019-04-25 DIAGNOSIS — F11.20 UNCOMPLICATED OPIOID DEPENDENCE (HCC): ICD-10-CM

## 2019-04-25 PROCEDURE — 90834 PSYTX W PT 45 MINUTES: CPT | Performed by: SOCIAL WORKER

## 2019-04-25 NOTE — PROGRESS NOTES
Date of Service: April 25, 2019  Time In: 8:10 AM  Time Out: 8:55 AM      PROGRESS NOTE  Data:  Valentín Mcghee is a 28 y.o. male who met with the undersigned for a regularly scheduled individual outpatient therapy session at the [facility].  Patient was seen for IOP assessment by this therapist on 3/7/2019.  He planned to enter the IOP program, but has not been able to get these support and cooperation of his employer.     HPI:   Patient is currently working at [employer] and said that they will not give him the time off to attend IOP.  He reports if he does not come to work he will be fired.  He is not willing to risk losing his job at this point, so he is seeing the nurse practitioner for medications including naltrexone, and he plans to continue individual therapy.  He is also attending at least 2 AA/NA meetings weekly.    Patient said that he has had a lot of anxiety and even panic.  He reports living with his sister has been very stressful and he feels like they want him to leave at the earliest possible time.  He states his family loves him and want to support him, but they really do not understand why he cannot just returned to normal after a 30-day treatment program.  Patient is looking into [facility] which is a transitional program for addicts.  He would be living with others who know about addiction, and he would be out of the stressful living situation with his sister.    Clinical Maneuvering/Intervention:  Assisted patient in processing above session content; acknowledged and normalized patient’s thoughts, feelings, and concerns.  Provided empathy and support as he processed the above content.  Supported him getting additional information about [facility], and possibly moving there.  Gave positive reinforcement for attending meetings.    Discussed patient's coping skills around managing panic/anxiety.  He reports he is using grounding exercises learned at [employer] [facility]--and meditation.   Reinforced his use of those skills.  Also taught deep breathing technique to use in the moment that he feels his anxiety rising.  He reports he is doing guided meditation almost daily which I encouraged.  Encouraged him to add exercise to his coping repertoire.  Discussed frequency of therapy sessions, and patient said he could come weekly; therapist agreed to this plan.    Allowed patient to freely discuss issues without interruption or judgment. Provided safe, confidential environment to facilitate the development of positive therapeutic relationship and encourage open, honest communication. Assisted patient in identifying risk factors which would indicate the need for higher level of care including thoughts to harm self or others and/or self-harming behavior and encouraged patient to contact this office, call 911, or present to the nearest emergency room should any of these events occur. Discussed crisis intervention services and means to access.  Patient adamantly and convincingly denies current suicidal or homicidal ideation or perceptual disturbance.    Assessment     Diagnoses and all orders for this visit:    Adjustment disorder with anxious mood    Methamphetamine addiction (CMS/HCC)    Uncomplicated opioid dependence (CMS/HCC)    Moderate episode of recurrent major depressive disorder (CMS/HCC)               Mental Status Exam  Hygiene:  good  Dress:  casual  Attitude:  Cooperative  Motor Activity:  Appropriate  Speech:  Normal  Mood:  anxious  Affect:  anxious  Thought Processes:  Goal directed and Linear  Thought Content:  normal  Suicidal Thoughts:  denies  Homicidal Thoughts:  denies  Crisis Safety Plan: yes, to come to the emergency room.  Hallucinations:  denies    Patient's Support Network Includes:  parents and extended family    Progress toward goal: Not at goal    Functional Status: Moderate impairment     Prognosis: Guarded with Ongoing Treatment      Plan   Patient will continue in individual  outpatient therapy weekly with focus on improved functioning and coping skills, maintaining sobriety, and avoiding decompensation and the need for higher level of care.    Patient will adhere to medication regimen as prescribed and report any side effects. Patient will contact this office, call 911 or present to the nearest emergency room should suicidal or homicidal ideations occur. Provide Cognitive Behavioral Therapy and Integrative Therapy to improve functioning, maintain stability, and avoid decompensation and the need for higher level of care.          Return in about 1 week (around 5/2/2019).      This document signed by Katelyn Rai LCSW, April 25, 2019 5:55 PM